# Patient Record
Sex: MALE | Race: WHITE | NOT HISPANIC OR LATINO | URBAN - METROPOLITAN AREA
[De-identification: names, ages, dates, MRNs, and addresses within clinical notes are randomized per-mention and may not be internally consistent; named-entity substitution may affect disease eponyms.]

---

## 2017-09-12 ENCOUNTER — EMERGENCY (EMERGENCY)
Facility: HOSPITAL | Age: 27
LOS: 0 days | Discharge: HOME | End: 2017-09-12
Attending: EMERGENCY MEDICINE
Payer: OTHER MISCELLANEOUS

## 2017-09-12 VITALS
WEIGHT: 192.02 LBS | SYSTOLIC BLOOD PRESSURE: 149 MMHG | DIASTOLIC BLOOD PRESSURE: 81 MMHG | OXYGEN SATURATION: 100 % | HEIGHT: 70 IN | HEART RATE: 94 BPM | TEMPERATURE: 98 F | RESPIRATION RATE: 18 BRPM
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
## 2017-09-12 DIAGNOSIS — S01.551A OPEN BITE OF LIP, INITIAL ENCOUNTER: ICD-10-CM
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
## 2017-09-12 DIAGNOSIS — S00.571A OTHER SUPERFICIAL BITE OF LIP, INITIAL ENCOUNTER: ICD-10-CM

## 2017-09-12 PROCEDURE — 99284 EMERGENCY DEPT VISIT MOD MDM: CPT

## 2017-09-12 RX ORDER — TETANUS TOXOID, REDUCED DIPHTHERIA TOXOID AND ACELLULAR PERTUSSIS VACCINE, ADSORBED 5; 2.5; 8; 8; 2.5 [IU]/.5ML; [IU]/.5ML; UG/.5ML; UG/.5ML; UG/.5ML
0.5 SUSPENSION INTRAMUSCULAR ONCE
Qty: 0 | Refills: 0 | Status: COMPLETED | OUTPATIENT
Start: 2017-09-12 | End: 2017-09-12

## 2017-09-12 RX ADMIN — TETANUS TOXOID, REDUCED DIPHTHERIA TOXOID AND ACELLULAR PERTUSSIS VACCINE, ADSORBED 0.5 MILLILITER(S): 5; 2.5; 8; 8; 2.5 SUSPENSION INTRAMUSCULAR at 12:17

## 2017-09-12 RX ADMIN — Medication 1 TABLET(S): at 12:18

## 2017-09-12 NOTE — ED PROVIDER NOTE - MOUTH
R. lower lip has 4 cm laceration going through bottom lower vermilion border, not through and through.

## 2017-09-12 NOTE — CONSULT NOTE ADULT - SUBJECTIVE AND OBJECTIVE BOX
CC:  while at work neighboring dog jumped up and dog's teeth caught lip    HPI:  26 year old male who was at work ([employer]) in [place] when the next door neighbor's dog who was passing was per report overly excited.  Per patient per the dog's owner immunization is up to date.    PMH:  none, no diabetes    PSH:  none    Medications:  none    Allergies:  NKDA    SH/FH:  noncontributory    ROS: Patient denies any other concerns    PE:  Afebrile with stable vital signs    head is normocephalic and atraumatic except for the lower lip.  no other body injuries.    Complex wound to the right lower lip not involving right commisure but involves the dry mucosa, vermillion border, and lower lip skin.  there were two flaps of tissue with bases of flap toward the moist mucosa.  There was bluish discoloration of the moist mucosa but no open wounds.  Dentition appeared intact.      The lip flap wounds were partial thickness involving a small amount of muscle.  There was injury to the central naturally-depressed (cupid's bow-like) region of the lower lip.  The distal portions of the flap appeared ischemic and required excision.  The total length of the laceration was 4.5 cm.

## 2017-09-12 NOTE — CONSULT NOTE ADULT - ASSESSMENT
26 year old male dog bite injury to the right lower lip from neighboring dog while at work at [employer].  Wound hand been irrigated and debrided and repaired

## 2017-09-12 NOTE — ED ADULT NURSE NOTE - OBJECTIVE STATEMENT
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

## 2017-09-12 NOTE — CONSULT NOTE ADULT - PROBLEM SELECTOR RECOMMENDATION 9
oral antibiotics (augmentin), oral hygeinge (rinse mouth four times per day with diluted alcohol free mouth wash or water, cold compress on and off for 2 days, and followup with Dr. Mckeon in 1 week or sooner for concerns

## 2017-09-12 NOTE — ED PROVIDER NOTE - OBJECTIVE STATEMENT
Pt is a 25 yo gentleman with no significant past medical history who presents to the ED with bite of lower R. lip. He was bending down to pet a dog he has known in the past with known owner, and dog jumped to lick him and dogs teeth caught on his lip. Dog owner says dog is up to date on vaccinations including rabies. Patient doesn't remember last tdap. No numbness or tingling. Declines rabies vaccinations.